# Patient Record
Sex: MALE | Race: WHITE | NOT HISPANIC OR LATINO | Employment: FULL TIME | URBAN - METROPOLITAN AREA
[De-identification: names, ages, dates, MRNs, and addresses within clinical notes are randomized per-mention and may not be internally consistent; named-entity substitution may affect disease eponyms.]

---

## 2017-06-27 DIAGNOSIS — Z00.00 ROUTINE GENERAL MEDICAL EXAMINATION AT A HEALTH CARE FACILITY: Primary | ICD-10-CM

## 2017-08-24 ENCOUNTER — CLINICAL SUPPORT (OUTPATIENT)
Dept: INTERNAL MEDICINE | Facility: CLINIC | Age: 42
End: 2017-08-24
Payer: COMMERCIAL

## 2017-08-24 ENCOUNTER — OFFICE VISIT (OUTPATIENT)
Dept: PULMONOLOGY | Facility: CLINIC | Age: 42
End: 2017-08-24
Payer: COMMERCIAL

## 2017-08-24 ENCOUNTER — HOSPITAL ENCOUNTER (OUTPATIENT)
Dept: RADIOLOGY | Facility: HOSPITAL | Age: 42
Discharge: HOME | End: 2017-08-24
Attending: INTERNAL MEDICINE
Payer: COMMERCIAL

## 2017-08-24 ENCOUNTER — HOSPITAL ENCOUNTER (OUTPATIENT)
Dept: CARDIOLOGY | Facility: CLINIC | Age: 42
Discharge: HOME | End: 2017-08-24
Payer: COMMERCIAL

## 2017-08-24 VITALS
HEART RATE: 66 BPM | BODY MASS INDEX: 30.36 KG/M2 | WEIGHT: 205 LBS | SYSTOLIC BLOOD PRESSURE: 101 MMHG | DIASTOLIC BLOOD PRESSURE: 66 MMHG | HEIGHT: 69 IN

## 2017-08-24 DIAGNOSIS — Z00.00 ROUTINE GENERAL MEDICAL EXAMINATION AT A HEALTH CARE FACILITY: ICD-10-CM

## 2017-08-24 DIAGNOSIS — Z00.00 ROUTINE GENERAL MEDICAL EXAMINATION AT A HEALTH CARE FACILITY: Primary | ICD-10-CM

## 2017-08-24 DIAGNOSIS — Z00.00 ANNUAL PHYSICAL EXAM: Primary | ICD-10-CM

## 2017-08-24 LAB
ALBUMIN SERPL BCP-MCNC: 4 G/DL
ALP SERPL-CCNC: 55 U/L
ALT SERPL W/O P-5'-P-CCNC: 18 U/L
ANION GAP SERPL CALC-SCNC: 6 MMOL/L
AST SERPL-CCNC: 18 U/L
BILIRUB SERPL-MCNC: 0.6 MG/DL
BUN SERPL-MCNC: 16 MG/DL
CALCIUM SERPL-MCNC: 9.7 MG/DL
CHLORIDE SERPL-SCNC: 106 MMOL/L
CHOLEST/HDLC SERPL: 3.7 {RATIO}
CO2 SERPL-SCNC: 30 MMOL/L
CREAT SERPL-MCNC: 0.9 MG/DL
DIASTOLIC DYSFUNCTION: NO
ERYTHROCYTE [DISTWIDTH] IN BLOOD BY AUTOMATED COUNT: 12 %
EST. GFR  (AFRICAN AMERICAN): >60 ML/MIN/1.73 M^2
EST. GFR  (NON AFRICAN AMERICAN): >60 ML/MIN/1.73 M^2
ESTIMATED AVG GLUCOSE: 91 MG/DL
GLUCOSE SERPL-MCNC: 89 MG/DL
HBA1C MFR BLD HPLC: 4.8 %
HCT VFR BLD AUTO: 41.8 %
HDL/CHOLESTEROL RATIO: 27.1 %
HDLC SERPL-MCNC: 170 MG/DL
HDLC SERPL-MCNC: 46 MG/DL
HGB BLD-MCNC: 14.6 G/DL
HIV 1+2 AB+HIV1 P24 AG SERPL QL IA: NEGATIVE
LDLC SERPL CALC-MCNC: 111.2 MG/DL
MCH RBC QN AUTO: 30.9 PG
MCHC RBC AUTO-ENTMCNC: 34.9 G/DL
MCV RBC AUTO: 89 FL
NONHDLC SERPL-MCNC: 124 MG/DL
PLATELET # BLD AUTO: 209 K/UL
PMV BLD AUTO: 10 FL
POTASSIUM SERPL-SCNC: 4.7 MMOL/L
PROT SERPL-MCNC: 6.7 G/DL
RBC # BLD AUTO: 4.72 M/UL
SODIUM SERPL-SCNC: 142 MMOL/L
TRIGL SERPL-MCNC: 64 MG/DL
TSH SERPL DL<=0.005 MIU/L-ACNC: 1.62 UIU/ML
WBC # BLD AUTO: 3.87 K/UL

## 2017-08-24 PROCEDURE — 99386 PREV VISIT NEW AGE 40-64: CPT | Mod: S$GLB,,, | Performed by: INTERNAL MEDICINE

## 2017-08-24 PROCEDURE — 83036 HEMOGLOBIN GLYCOSYLATED A1C: CPT

## 2017-08-24 PROCEDURE — 36415 COLL VENOUS BLD VENIPUNCTURE: CPT

## 2017-08-24 PROCEDURE — 86703 HIV-1/HIV-2 1 RESULT ANTBDY: CPT

## 2017-08-24 PROCEDURE — 84443 ASSAY THYROID STIM HORMONE: CPT

## 2017-08-24 PROCEDURE — 85027 COMPLETE CBC AUTOMATED: CPT

## 2017-08-24 PROCEDURE — 93015 CV STRESS TEST SUPVJ I&R: CPT | Mod: S$GLB,,, | Performed by: INTERNAL MEDICINE

## 2017-08-24 PROCEDURE — 80061 LIPID PANEL: CPT

## 2017-08-24 PROCEDURE — 99999 PR PBB SHADOW E&M-EST. PATIENT-LVL III: CPT | Mod: PBBFAC,,, | Performed by: INTERNAL MEDICINE

## 2017-08-24 PROCEDURE — 97802 MEDICAL NUTRITION INDIV IN: CPT | Mod: S$GLB,,, | Performed by: INTERNAL MEDICINE

## 2017-08-24 PROCEDURE — 71020 XR CHEST PA AND LATERAL: CPT | Mod: 26,,, | Performed by: RADIOLOGY

## 2017-08-24 PROCEDURE — 99213 OFFICE O/P EST LOW 20 MIN: CPT | Mod: PBBFAC,25 | Performed by: INTERNAL MEDICINE

## 2017-08-24 PROCEDURE — 71020 XR CHEST PA AND LATERAL: CPT | Mod: TC

## 2017-08-24 PROCEDURE — 80053 COMPREHEN METABOLIC PANEL: CPT

## 2017-08-24 PROCEDURE — 97750 PHYSICAL PERFORMANCE TEST: CPT | Mod: S$GLB,,, | Performed by: INTERNAL MEDICINE

## 2017-08-24 RX ORDER — MONTELUKAST SODIUM 10 MG/1
1 TABLET ORAL EVERY MORNING
Refills: 3 | COMMUNITY
Start: 2017-08-07 | End: 2019-08-12

## 2017-08-24 RX ORDER — ESCITALOPRAM OXALATE 10 MG/1
10 TABLET ORAL DAILY
COMMUNITY
End: 2019-08-12

## 2017-08-24 RX ORDER — BUSPIRONE HYDROCHLORIDE 15 MG/1
1 TABLET ORAL DAILY
Refills: 1 | COMMUNITY
Start: 2017-08-07 | End: 2017-08-24

## 2017-08-24 NOTE — PROGRESS NOTES
Nutrition Assessment    This is a general nutrition consultation as per the contractual agreement by client employer's insurance provider.    Client name:  Mehrdad Smith Jr.  :  1975  Age:  42 y.o.  Gender:  male     PMH: mother has HTN, Dad Colon Cancer and CABG x4 and brother has colon cancer  Client states:  This is his first  physical and his employer is [employer]. He has 3 children, 2 sons ages 11 and 9 and daughter age 5 and his wife is Alee. Based on his family history he has colonoscopy every 5 years and all have been negative. He mentions his brother who currently has colon cancer and is not expected to have long life span. 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 In his past, he played football and did not have to lose wt, but of course it was monitored by the Coaches. His heaviest wt was 265# and decreased to 240# through exercise. Today his wt. Is 205#. He has noticed that on Monday his  wt. Can shift 8.5# and thinks it is related to fluid gain and by Wednesday he returns to his usual wt. He knows what to eat and the good choices, wakes up hungry, always consumes breakfast, but thinks his portions when dining in restaurants are large. A new assessment is he craves more sweets and monitors his carb intake and usually has lean protein and veggies for dinner. His goals are to lose wt., exercise more regularly and eliminate the need for Lexapro.      No past medical history on file.    Social History    Marital status:    Employment:  [employer]    Social History   Substance Use Topics    Smoking status: Never Smoker    Smokeless tobacco: Never Used    Alcohol use Yes      Comment: social drinker - 2 beers every 2 weeks        Current medications:  has a current medication list which includes the following prescription(s): escitalopram  "oxalate and montelukast,.  Vitamins, minerals, and/or supplements:  Men's MVI   Food allergies or intolerances:  none     Food History  Breakfast:  Egg or egg whites or greek yogurt with honey, coffee with Stevia  Mid-morning Snack:  Zone bar or RTD Iconic shake  Lunch:  Grilled chicken over greens with vinaigrette dressing or turkey club on wheat, water  Mid-afternoon Snack:  none  Dinner:  Rotisserie chicken with veggies  H.S. Snack:  none  6. 5 bottles water    Exercise History:  No formal exercise routine    Lab Reports   Total Cholesterol:  170    Triglycerides:  64  HDL:  46  LDL:  111.2   Glucose:  89  HbA1c:  In process   BP:  unknown     Weight History  Height:  5'9"     Weight:  205  BMI:  30.27  % Body Fat:  Unknown (No fitness appointment this visit)    Diagnosis  RMR (Method:  [place]):  1867  kcal  Activity Factor:  1.3  CONNOR:  2427 - 250 = 2177    Overweight related to inadequate physical activity as evidenced by BMI: 30.27.    Intervention    Goals:  1.  Begin exercising on Sat and Sun for 1 hr.and advance to 3x/wk  2.  Goal wt. 190 - 195#  3.  Eliminate Lexapro  4.  Mindful portions of whole grain carbs at dinner to control sweet cravings  5.  Use guides for healthier restaurant selections 25% of time    Reviewed and complimented client on healthy labs. Discussed muscle memory and benefits of prioritizing exercise to burn adipose tissue. Encouraged 75/25 ratio of aerobic to resistance training to attain wt. Goal. Cautioned client about no starches at dinner meal, which due to blood sugar decrease may be promoting sugar cravings. Reviewed healthy portions via visual plate method and encouraged consuming dinner 4 hours prior to bedtime. High sodium food intake on weekends likely related to fluid retention therefore select more simply prepared foods. Calculated daily fluid goal and intake appears in range. Encouraged whole grain carb selections and can use broccoli or cauliflower rice as added " vegetable. Reviewed criteria for healthy protein bars and better options with less sugar than Zone bars. Reviewed Restaurant Dining and Fast Food Guides for healthy selections. Client assisted with goal development and appears receptive to accomplishing his goals.       Handouts provided:  Meal Planning Guide  Restaurant Guide  Eat Fit Shopping List  Eat Fit Pam  Fast Food Guide  Vitamin/Mineral Guide    Monitoring/Evaluation    Monitor the following:  Weight  BMI  % Body Fat  Caloric intake  Labs: CMP/Lipids    Follow Up Plan:  Follow up with client in 1-2 years

## 2017-08-24 NOTE — LETTER
August 24, 2017    Mehrdad Smith   [address] [id number]             [facility] Pul[facility]  [address]  [facility] [address]  Phone: [phone] Dear Mr. Smith:    Thank you for allowing me to serve you and perform your Executive Health exam on 8/24/2017. This letter will serve as a brief summary of the physical findings and laboratory/studies performed and recommendations at this time. Your assessment is essentially within normal limits. Keep up on the periodic colonoscopies in light of your family history.  Enjoy the insight into [employer] football, and would be happy to see other members of the [facility] Family.        If you have any questions or concerns, please don't hesitate to call.    Sincerely,        Masoud Crowley MD

## 2019-07-09 DIAGNOSIS — Z00.00 ROUTINE GENERAL MEDICAL EXAMINATION AT A HEALTH CARE FACILITY: Primary | ICD-10-CM

## 2019-08-12 ENCOUNTER — HOSPITAL ENCOUNTER (OUTPATIENT)
Dept: CARDIOLOGY | Facility: CLINIC | Age: 44
Discharge: HOME | End: 2019-08-12
Payer: COMMERCIAL

## 2019-08-12 ENCOUNTER — CLINICAL SUPPORT (OUTPATIENT)
Dept: INTERNAL MEDICINE | Facility: CLINIC | Age: 44
End: 2019-08-12

## 2019-08-12 ENCOUNTER — OFFICE VISIT (OUTPATIENT)
Dept: INTERNAL MEDICINE | Facility: CLINIC | Age: 44
End: 2019-08-12
Payer: COMMERCIAL

## 2019-08-12 ENCOUNTER — CLINICAL SUPPORT (OUTPATIENT)
Dept: INTERNAL MEDICINE | Facility: CLINIC | Age: 44
End: 2019-08-12
Payer: COMMERCIAL

## 2019-08-12 VITALS
SYSTOLIC BLOOD PRESSURE: 114 MMHG | DIASTOLIC BLOOD PRESSURE: 68 MMHG | BODY MASS INDEX: 30.47 KG/M2 | WEIGHT: 205.69 LBS | HEIGHT: 69 IN | HEART RATE: 73 BPM

## 2019-08-12 DIAGNOSIS — Z00.00 ROUTINE GENERAL MEDICAL EXAMINATION AT A HEALTH CARE FACILITY: Primary | ICD-10-CM

## 2019-08-12 DIAGNOSIS — Z00.00 HEALTH CARE MAINTENANCE: Primary | ICD-10-CM

## 2019-08-12 DIAGNOSIS — Z00.00 ROUTINE GENERAL MEDICAL EXAMINATION AT A HEALTH CARE FACILITY: ICD-10-CM

## 2019-08-12 DIAGNOSIS — F41.8 SITUATIONAL ANXIETY: ICD-10-CM

## 2019-08-12 LAB
ALBUMIN SERPL BCP-MCNC: 4 G/DL (ref 3.5–5.2)
ALP SERPL-CCNC: 64 U/L (ref 55–135)
ALT SERPL W/O P-5'-P-CCNC: 14 U/L (ref 10–44)
ANION GAP SERPL CALC-SCNC: 7 MMOL/L (ref 8–16)
AST SERPL-CCNC: 17 U/L (ref 10–40)
BILIRUB SERPL-MCNC: 0.5 MG/DL (ref 0.1–1)
BUN SERPL-MCNC: 10 MG/DL (ref 6–20)
CALCIUM SERPL-MCNC: 9.2 MG/DL (ref 8.7–10.5)
CHLORIDE SERPL-SCNC: 105 MMOL/L (ref 95–110)
CHOLEST SERPL-MCNC: 170 MG/DL (ref 120–199)
CHOLEST/HDLC SERPL: 3.6 {RATIO} (ref 2–5)
CO2 SERPL-SCNC: 27 MMOL/L (ref 23–29)
CREAT SERPL-MCNC: 0.8 MG/DL (ref 0.5–1.4)
ERYTHROCYTE [DISTWIDTH] IN BLOOD BY AUTOMATED COUNT: 12 % (ref 11.5–14.5)
EST. GFR  (AFRICAN AMERICAN): >60 ML/MIN/1.73 M^2
EST. GFR  (NON AFRICAN AMERICAN): >60 ML/MIN/1.73 M^2
ESTIMATED AVG GLUCOSE: 94 MG/DL (ref 68–131)
GLUCOSE SERPL-MCNC: 90 MG/DL (ref 70–110)
HBA1C MFR BLD HPLC: 4.9 % (ref 4–5.6)
HCT VFR BLD AUTO: 41.6 % (ref 40–54)
HDLC SERPL-MCNC: 47 MG/DL (ref 40–75)
HDLC SERPL: 27.6 % (ref 20–50)
HGB BLD-MCNC: 14.3 G/DL (ref 14–18)
LDLC SERPL CALC-MCNC: 112 MG/DL (ref 63–159)
MCH RBC QN AUTO: 31 PG (ref 27–31)
MCHC RBC AUTO-ENTMCNC: 34.4 G/DL (ref 32–36)
MCV RBC AUTO: 90 FL (ref 82–98)
NONHDLC SERPL-MCNC: 123 MG/DL
PLATELET # BLD AUTO: 220 K/UL (ref 150–350)
PMV BLD AUTO: 9.8 FL (ref 9.2–12.9)
POTASSIUM SERPL-SCNC: 4.1 MMOL/L (ref 3.5–5.1)
PROT SERPL-MCNC: 6.5 G/DL (ref 6–8.4)
RBC # BLD AUTO: 4.61 M/UL (ref 4.6–6.2)
SODIUM SERPL-SCNC: 139 MMOL/L (ref 136–145)
TRIGL SERPL-MCNC: 55 MG/DL (ref 30–150)
TSH SERPL DL<=0.005 MIU/L-ACNC: 2.83 UIU/ML (ref 0.4–4)
WBC # BLD AUTO: 4.66 K/UL (ref 3.9–12.7)

## 2019-08-12 PROCEDURE — 99999 PR PBB SHADOW E&M-EST. PATIENT-LVL III: ICD-10-PCS | Mod: PBBFAC,,, | Performed by: INTERNAL MEDICINE

## 2019-08-12 PROCEDURE — 99386 PREV VISIT NEW AGE 40-64: CPT | Mod: S$GLB,,, | Performed by: INTERNAL MEDICINE

## 2019-08-12 PROCEDURE — 97750 PR PHYSICAL PERFORMANCE TEST: ICD-10-PCS | Mod: S$GLB,,, | Performed by: INTERNAL MEDICINE

## 2019-08-12 PROCEDURE — 84443 ASSAY THYROID STIM HORMONE: CPT

## 2019-08-12 PROCEDURE — 93000 ELECTROCARDIOGRAM COMPLETE: CPT | Mod: S$GLB,,, | Performed by: INTERNAL MEDICINE

## 2019-08-12 PROCEDURE — 85027 COMPLETE CBC AUTOMATED: CPT

## 2019-08-12 PROCEDURE — 97750 PHYSICAL PERFORMANCE TEST: CPT | Mod: S$GLB,,, | Performed by: INTERNAL MEDICINE

## 2019-08-12 PROCEDURE — 36415 COLL VENOUS BLD VENIPUNCTURE: CPT

## 2019-08-12 PROCEDURE — 97802 PR MED NUTR THER, 1ST, INDIV, EA 15 MIN: ICD-10-PCS | Mod: S$GLB,,, | Performed by: INTERNAL MEDICINE

## 2019-08-12 PROCEDURE — 83036 HEMOGLOBIN GLYCOSYLATED A1C: CPT

## 2019-08-12 PROCEDURE — 93000 EKG 12-LEAD: ICD-10-PCS | Mod: S$GLB,,, | Performed by: INTERNAL MEDICINE

## 2019-08-12 PROCEDURE — 99999 PR PBB SHADOW E&M-EST. PATIENT-LVL III: CPT | Mod: PBBFAC,,, | Performed by: INTERNAL MEDICINE

## 2019-08-12 PROCEDURE — 80061 LIPID PANEL: CPT

## 2019-08-12 PROCEDURE — 99386 PR PREVENTIVE VISIT,NEW,40-64: ICD-10-PCS | Mod: S$GLB,,, | Performed by: INTERNAL MEDICINE

## 2019-08-12 PROCEDURE — 97802 MEDICAL NUTRITION INDIV IN: CPT | Mod: S$GLB,,, | Performed by: INTERNAL MEDICINE

## 2019-08-12 PROCEDURE — 80053 COMPREHEN METABOLIC PANEL: CPT

## 2019-08-12 NOTE — PROGRESS NOTES
"Nutrition Assessment  Client name:  Mehrdad Smith Jr.  :  1975  Age:  44 y.o.  Gender:  male    Client states:  Very pleasant gentleman here for his annual Executive Health physical.  Last physical was two years ago.  Employed with [employer] and  with three children, ages 13 (son), 11 (son), and 7 (daughter), all of whom are involved in extracurricular activities.  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  Strives to maintain a fairly healthy and active lifestyle as he has a family history of colon cancer (father and brother) and liver cancer (brother).  Consequently, has undergone colonoscopies in the past, all of which have returned negative.  Shares weight loss history, noting that he was 50# heavier when playing football in school yet currently averages 200-205#.  Desires to improve his exercise consistency as he currently frequents a local gym on the weekends to perform various cardio and weight training activities.  Realizes the importance of physical activity yet is challenged with week time frequency [place], and family life.  Is health conscious regarding his food choices, consuming three meals and 2-3 snacks daily.  Selects mostly lean meat, non-starchy vegetables, grains, fruits, etc.  Limits availability of sugary drinks and snacks within his house so as to reduce temptation and drinks mostly water, coffee, [place], and tea.  Has tracked intake via My Fitness Pal in the past, discussing the importance of greater attention to dietary intake given his limited exercise frequency.  Has no other specific nutrition-related questions at this time.  Expressed gratitude and satisfaction for nutrition consult and overall EH program.      Anthropometrics  Height:  5' 8.25"     Weight:  204#  BMI:  30.8  % Body Fat:  25.99%    Clinical Signs/Symptoms  N/V/D:  None  Appetite (Good, Fair, or Poor):  Good      No past medical " history on file.    Past Surgical History:   Procedure Laterality Date    KNEE ARTHROSCOPY W/ LATERAL RELEASE Left 2000       Medications    has a current medication list which includes the following prescription(s): escitalopram oxalate and montelukast.    Vitamins, Minerals, and/or Supplements:  MVI     Food/Medication Interactions:  Reviewed     Food Allergies or Intolerances:  NKFA     Social History    Marital status:  Other  Employment:  [employer]    Social History     Tobacco Use    Smoking status: Never Smoker    Smokeless tobacco: Never Used   Substance Use Topics    Alcohol use: Yes     Comment: social drinker        Lab Reports   Total Cholesterol:  170    Triglycerides:  55  HDL:  47  LDL:  112   Glucose:  90  HbA1c:  4.9%  BP:  110/84 (RBP)     Food History  Breakfast:  Zone bar/eggs + toast + Greek yogurt + coffee  Mid-morning Snack:  Protein shake/protein bar  Lunch:  Restaurant salad with grilled chicken + water or [place]  Mid-afternoon Snack:  Unable to assess  Dinner:  Rotisserie chicken + vegetables +/- macaroni and cheese/spaghetti   H.S. Snack:  +/- granola  *Fluid intake:  Water, coffee, tea, [place]    Exercise History:  Walks/jogs 30 minutes 2x/week + weight training 1-2x/week    Cultural/Spiritual/Personal Preferences:  None identified    Support System:  Family    State of Change:  Action    Barriers to Change:  None    Diagnosis    No nutrition-related diagnosis at this time.    Intervention    RMR (Method:  [place]):  1890 kcal  Activity Factor:  1.3  CONNOR:  2457 - 500 = 1957 kcal    Goals:  1.  Achieve and maintain healthy body weight  2.  Continue weekend exercise for 30-60 minutes 2x/week as tolerated  3.  Intersperse short bouts of physical activity into ADL  4.  Continue 3 meals and 2 snacks daily    Nutrition Education  To note, labs were unavailable at time of consult and so, not discussed.  Remain unchanged from 2017, however.  Complimented patient on healthy mindset in  remaining proactive in health maintenance, encouraging him to maintain healthy dietary behaviors.  Reviewed the benefits of meal and snack frequency, encouraging maintenance of three meals and two snacks daily.  Supported patient's desire for enhanced nutrient density with breakfast, advising inclusion of lean protein and fiber/CHO.  Reviewed the potential health consequences of late night snacking, encouraging limit of intake 2 hours before bed.  Also, answered patient's questions regarding REE and CONNOR, benefits of routine self-monitoring, and meal planning.  Furthermore, supported patient's desire for improved exercise consistency, discussing the difference between lifestyle physical activity and exercise.  Advised continued weekend exercise in addition to interspersing short bouts of physical activity into ADL as able.  Deferred further specifics to exercise physiologist at this time.  Stressed the overall importance of maintaining proper nutrition, physical activity, and a healthy body weight.    Patient verbalized understanding of nutrition education and recommendations received.    Handouts Provided  Meal Planning Guide  Restaurant Guide  Eat Fit Shopping List  Eat Fit Pam  Fast Food Guide  Vitamin/Mineral Guide    Monitoring/Evaluation    Monitor the following:  Weight  BMI  % Body Fat  Caloric intake    Follow Up Plan:  Communication with referring healthcare provider is unnecessary at this time as patient presented as part of annual wellness exam.  However, will follow up with patient in 1-2 years.

## 2019-08-12 NOTE — PROGRESS NOTES
"Subjective:       Patient ID: Mehrdad Smith Jr. is a 44 y.o. male.    Chief Complaint: Executive Health    HPI   Mehrdad Smith Jr. is a 44 y.o. male here for an annual executive health physical.     Family hx of colon cancer in father and brother. cscope 2016.    Parents are in their 80s, transiting to [facility], brother on hospice.   Trouble sleeping/ staying asleep.    Previously on lexapro, but no longer. Made him feel somewhat like a zombie.  Started when moved to [facility].       Review of Systems   Constitutional: Negative for fever.   HENT: Negative.    Eyes: Negative.    Respiratory: Negative for shortness of breath.    Cardiovascular: Negative for chest pain and leg swelling.   Gastrointestinal: Negative for abdominal pain, diarrhea, nausea and vomiting.   Genitourinary: Negative.    Musculoskeletal: Negative.  Negative for arthralgias.   Skin: Negative.  Negative for rash.   Psychiatric/Behavioral: Negative.        Objective:   /68 (BP Location: Left arm, Patient Position: Sitting, BP Method: Large (Manual))   Pulse 73   Ht 5' 9" (1.753 m)   Wt 93.3 kg (205 lb 11 oz)   BMI 30.38 kg/m²      Physical Exam   Constitutional: He is oriented to person, place, and time. He appears well-developed and well-nourished.   HENT:   Head: Normocephalic and atraumatic.   Eyes: Pupils are equal, round, and reactive to light. Conjunctivae and EOM are normal.   Neck: Neck supple. No thyromegaly present.   Cardiovascular: Normal rate, regular rhythm and normal heart sounds.   No murmur heard.  Pulmonary/Chest: Effort normal and breath sounds normal. No respiratory distress. He has no wheezes.   Abdominal: Soft. Bowel sounds are normal. He exhibits no distension. There is no tenderness.   Musculoskeletal: Normal range of motion.   Neurological: He is alert and oriented to person, place, and time.   Skin: Skin is warm and dry. No rash noted.   Psychiatric: He has a normal mood and affect. Judgment and thought " content normal.   Vitals reviewed.      Assessment:       1. Health care maintenance    2. Situational anxiety        Plan:       Mehrdad was seen today for [facility] health.    Diagnoses and all orders for this visit:    Health care maintenance  Labs appropriate  cscope every 5 years due to family hx colon cancer    Situational anxiety  Related to brother's and parent's health issues. Discussed possible lexapro vs prn hydroxyzine. Does not feel he needs meds at this time but will let me know if so        tdap done in last 10 years

## 2019-08-12 NOTE — PROGRESS NOTES
Subjective:       Patient ID: Mehrdad Smith Jr. is a 44 y.o. male.    Chief Complaint: No chief complaint on file.    HPI   Pt. Has no significant cardiovascular or pulmonary history.    Physical Limitations:  None.      Current exercise routine:  Patient currently walks/jogs for 30 minutes, 2 days a week.  Patient performs full-body resistance training exercises, 1-2 days a week.  Patient does not follow any formal flexibility routine at the current time.    Goals: Patient set a goal weight of 195 lbs.    Fun Facts:  Patient was very friendly and engaged.  Patient stated that he stays active with his 13 and 11 year old boys at home, playing catch and participating in other outdoor activities.  Patient was a football player and stated that he used to weigh a lot more and that when he gets below 195 lbs, people comment that he looks to skinny.  Patient seemed semi-motivated to lose weight and increase his current exercise routine.  Patient was receptive to all recommendations made.      Review of Systems    Objective:     The fitness evaluation results are as follows:  D.O.S. 8/12/2019   Height (in): 68.25   Weight (lbs): 204   BMI: 30.332011   Body Fat (%): 25.99   Waist (cm): 96   Hip (cm): 110   WHR: 0.87   RBP (mmHg): 110/84   RHR (bpm): 64    Strength R (lbs)t: 120    Strength Lt (lbs): 106.11696   Push-up Assessment: 37   Curl-up Assessment: 75   Flexibility Testing (cm): 19   REE (kcals): 1890       Physical Exam    Assessment:     Age/gender stratified assessment:  Resting BP: Within Normal Limits   Body Fat %: Fair   WHR Risk Factor: Low Risk    Strength R: Average    Strength L: Average   Upper Body Endurance: Excellent   Abdominal Endurance: Well Above Average   Lower body Flexibiltiy: Fair       1. Routine general medical examination at a health care facility        Plan:       Recommended fitness guidelines:    -150 minutes of moderate intensity aerobic exercise per week or 75 minutes  of vigorous intensity aerobic exercise per week.  Try to reach a minimum of 150 minutes of moderate to vigorous intensity aerobic activity per week by walking for 30 minutes, 5 days a week.  Incorporate some interval training to increase your heart rate for short periods of time.      -2 to 4 days per week of resistance training for each muscle group.   If you only make it to the gym once a week, practice some body-weight resistance training exercises, such as push-ups, pull-ups, planks, squats, and lunges, one more day of the week.      -Daily stretching with a hold of at least 30 seconds per muscle group.   Practice the seated hamstring stretch, demonstrated during the evaluation, daily.

## 2021-02-27 ENCOUNTER — IMMUNIZATION (OUTPATIENT)
Dept: PRIMARY CARE CLINIC | Facility: CLINIC | Age: 46
End: 2021-02-27
Payer: OTHER GOVERNMENT

## 2021-02-27 DIAGNOSIS — Z23 NEED FOR VACCINATION: Primary | ICD-10-CM

## 2021-02-27 PROCEDURE — 91300 COVID-19, MRNA, LNP-S, PF, 30 MCG/0.3 ML DOSE VACCINE: CPT | Mod: S$GLB,,, | Performed by: FAMILY MEDICINE

## 2021-02-27 PROCEDURE — 91300 COVID-19, MRNA, LNP-S, PF, 30 MCG/0.3 ML DOSE VACCINE: ICD-10-PCS | Mod: S$GLB,,, | Performed by: FAMILY MEDICINE

## 2021-02-27 PROCEDURE — 0001A COVID-19, MRNA, LNP-S, PF, 30 MCG/0.3 ML DOSE VACCINE: ICD-10-PCS | Mod: S$GLB,,, | Performed by: FAMILY MEDICINE

## 2021-02-27 PROCEDURE — 0001A COVID-19, MRNA, LNP-S, PF, 30 MCG/0.3 ML DOSE VACCINE: CPT | Mod: S$GLB,,, | Performed by: FAMILY MEDICINE

## 2021-03-20 ENCOUNTER — IMMUNIZATION (OUTPATIENT)
Dept: PRIMARY CARE CLINIC | Facility: CLINIC | Age: 46
End: 2021-03-20
Payer: COMMERCIAL

## 2021-03-20 DIAGNOSIS — Z23 NEED FOR VACCINATION: Primary | ICD-10-CM

## 2021-03-20 PROCEDURE — 91300 COVID-19, MRNA, LNP-S, PF, 30 MCG/0.3 ML DOSE VACCINE: ICD-10-PCS | Mod: S$GLB,,, | Performed by: FAMILY MEDICINE

## 2021-03-20 PROCEDURE — 91300 COVID-19, MRNA, LNP-S, PF, 30 MCG/0.3 ML DOSE VACCINE: CPT | Mod: S$GLB,,, | Performed by: FAMILY MEDICINE

## 2021-03-20 PROCEDURE — 0002A COVID-19, MRNA, LNP-S, PF, 30 MCG/0.3 ML DOSE VACCINE: ICD-10-PCS | Mod: CV19,S$GLB,, | Performed by: FAMILY MEDICINE

## 2021-03-20 PROCEDURE — 0002A COVID-19, MRNA, LNP-S, PF, 30 MCG/0.3 ML DOSE VACCINE: CPT | Mod: CV19,S$GLB,, | Performed by: FAMILY MEDICINE

## 2021-04-05 ENCOUNTER — PATIENT MESSAGE (OUTPATIENT)
Dept: ADMINISTRATIVE | Facility: HOSPITAL | Age: 46
End: 2021-04-05

## 2021-07-06 ENCOUNTER — PATIENT MESSAGE (OUTPATIENT)
Dept: ADMINISTRATIVE | Facility: HOSPITAL | Age: 46
End: 2021-07-06

## 2021-10-04 ENCOUNTER — PATIENT MESSAGE (OUTPATIENT)
Dept: ADMINISTRATIVE | Facility: HOSPITAL | Age: 46
End: 2021-10-04

## 2021-12-20 ENCOUNTER — IMMUNIZATION (OUTPATIENT)
Dept: FAMILY MEDICINE | Facility: CLINIC | Age: 46
End: 2021-12-20
Payer: OTHER GOVERNMENT

## 2021-12-20 DIAGNOSIS — Z23 NEED FOR VACCINATION: Primary | ICD-10-CM

## 2021-12-20 PROCEDURE — 0004A COVID-19, MRNA, LNP-S, PF, 30 MCG/0.3 ML DOSE VACCINE: CPT | Mod: PBBFAC,PO

## 2022-03-16 ENCOUNTER — PATIENT MESSAGE (OUTPATIENT)
Dept: ADMINISTRATIVE | Facility: HOSPITAL | Age: 47
End: 2022-03-16

## 2023-08-14 ENCOUNTER — TELEPHONE (OUTPATIENT)
Dept: INTERNAL MEDICINE | Facility: CLINIC | Age: 48
End: 2023-08-14

## 2023-08-14 NOTE — TELEPHONE ENCOUNTER
----- Message from Uli Irby sent at 8/14/2023  1:31 PM CDT -----  Regarding: pt call back  Name of Who is Calling:MARKEL DELA CRUZ JR. [[id number]]        What is the request in detail: Pt inquiring on next available appt, Pt hasn't seen provider the last two years if anything becomes open please contact soon.   Please advise           Can the clinic reply by [facility]: no         What Number to Call Back if not in [email or web address]: Telephone Information:  Mobile          [phone]

## 2024-08-20 NOTE — PROGRESS NOTES
Subjective:       Patient ID: Mehrdad Smith Jr. is a 42 y.o. male.    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 His 81 yo father  Had colon cancer several years ago and dong well. His  51 yo brother had colon cancer and then had late metastases to the liver and in struggling with the disease complicated by an infection. Not able to take any further chemo therapy because of infection. Patient has had two negative colonoscopies, the last one about 9 months ago. No bowel complaints.   Review of Systems   Constitutional: Negative.    HENT: Negative.    Eyes: Negative.    Respiratory: Negative.    Cardiovascular: Negative.    Gastrointestinal: Negative.         Strong family hx of colon cancer.    Genitourinary: Negative.    Musculoskeletal: Negative.         Knee scoping in 2000, No further problems   Skin: Negative.    Neurological: Negative.    Psychiatric/Behavioral: Negative.    All other systems reviewed and are negative.      Objective:      Physical Exam   Constitutional: He is oriented to person, place, and time. He appears well-developed and well-nourished.   HENT:   Head: Normocephalic and atraumatic.   Right Ear: External ear normal.   Left Ear: External ear normal.   Eyes: Conjunctivae and EOM are normal. Pupils are equal, round, and reactive to light.   Neck: Normal range of motion. Neck supple.   Cardiovascular: Normal rate, regular rhythm and normal heart sounds.    Pulmonary/Chest: Effort normal and breath sounds normal.   Abdominal: Soft. Bowel sounds are normal.   Musculoskeletal: Normal range of motion.   Neurological: He is alert and oriented to person, place, and time. He has normal reflexes.   Skin: Skin is warm and dry.   Psychiatric: He has a normal mood and affect. His behavior is normal. Judgment and thought content normal.      Urine micro shows mild elevation in protein.  CMP is doing okay.  A1c is mildly elevated at 5.7%.  CBC shows mild anemia.  Hep C screen test is negative.  Lipid panel is doing okay.  Thyroid function is normal.  PSA is normal for age group at 0.77.    Further discussion at next appointment.   Assessment:       No diagnosis found.    Plan:        Labs: All parameters are normal.Chest x-ray is clear and Stress EKG:Negative for ischemia. Got a Banks score of 7. IMP: Healthy Male

## 2024-10-08 ENCOUNTER — PATIENT OUTREACH (OUTPATIENT)
Dept: ADMINISTRATIVE | Facility: HOSPITAL | Age: 49
End: 2024-10-08

## 2024-10-08 NOTE — PROGRESS NOTES
Health Maintenance Due   Topic Date Due    Hepatitis C Screening  Never done    TETANUS VACCINE  Never done    Colorectal Cancer Screening  03/01/2022    Hemoglobin A1c (Diabetic Prevention Screening)  08/12/2022    Lipid Panel  08/12/2024    Influenza Vaccine (1) 09/01/2024    COVID-19 Vaccine (4 - 2024-25 season) 09/01/2024       Chart reviewed and updated. Reconciled immunizations.    Lesly Dutta LPN   Clinical Care Coordinator  Primary Care and Wellness